# Patient Record
Sex: MALE | Race: WHITE | NOT HISPANIC OR LATINO | Employment: PART TIME | URBAN - METROPOLITAN AREA
[De-identification: names, ages, dates, MRNs, and addresses within clinical notes are randomized per-mention and may not be internally consistent; named-entity substitution may affect disease eponyms.]

---

## 2020-01-04 ENCOUNTER — OFFICE VISIT (OUTPATIENT)
Dept: URGENT CARE | Facility: MEDICAL CENTER | Age: 20
End: 2020-01-04
Payer: COMMERCIAL

## 2020-01-04 VITALS
HEIGHT: 71 IN | BODY MASS INDEX: 37.49 KG/M2 | DIASTOLIC BLOOD PRESSURE: 60 MMHG | TEMPERATURE: 98.5 F | HEART RATE: 91 BPM | WEIGHT: 267.8 LBS | OXYGEN SATURATION: 98 % | RESPIRATION RATE: 16 BRPM | SYSTOLIC BLOOD PRESSURE: 124 MMHG

## 2020-01-04 DIAGNOSIS — L08.9 INFECTED BLISTER OF LEFT FOOT, INITIAL ENCOUNTER: ICD-10-CM

## 2020-01-04 DIAGNOSIS — S90.822A INFECTED BLISTER OF LEFT FOOT, INITIAL ENCOUNTER: ICD-10-CM

## 2020-01-04 PROCEDURE — 99204 OFFICE O/P NEW MOD 45 MIN: CPT | Performed by: NURSE PRACTITIONER

## 2020-01-04 RX ORDER — CEPHALEXIN 500 MG/1
500 CAPSULE ORAL 4 TIMES DAILY
Qty: 28 CAP | Refills: 0 | Status: SHIPPED | OUTPATIENT
Start: 2020-01-04 | End: 2020-01-11

## 2020-01-04 ASSESSMENT — ENCOUNTER SYMPTOMS
FEVER: 0
BRUISES/BLEEDS EASILY: 0
TINGLING: 0
WEAKNESS: 0
CHILLS: 0
SENSORY CHANGE: 0
MYALGIAS: 0

## 2020-01-04 NOTE — LETTER
January 4, 2020       Patient: Kurt Gill   YOB: 2000   Date of Visit: 1/4/2020         To Whom It May Concern:    It is my medical opinion that Kurt Gill be excused from work on 1/6/20 due to illness.    If you have any questions or concerns, please don't hesitate to call [phone]          Sincerely,          NICOLETTE Elam.  Electronically Signed

## 2020-01-05 NOTE — PROGRESS NOTES
"Subjective:      Kurt Gill is a 19 y.o. male who presents with Foot Pain (left heel blister x1week, blister infection x4days)            HPI  States has blister at Achilles region x 4 days. Probable for loose fitting shoes. Wears Vans to work and walks all day. Admist to popping blister but became red, swollen and had purulent d/c from site, admits to poking abscess open with sterile needle. Father present. Denies fever or red streaking up leg. Pressure/pain at site of blister but with walking.     PMH:  has no past medical history on file.  MEDS:   Current Outpatient Medications:   •  cephALEXin (KEFLEX) 500 MG Cap, Take 1 Cap by mouth 4 times a day for 7 days., Disp: 28 Cap, Rfl: 0  ALLERGIES: No Known Allergies  SURGHX: History reviewed. No pertinent surgical history.  SOCHX:  reports that he has never smoked. He has never used smokeless tobacco.  FH: Family history was reviewed, no pertinent findings to report    Review of Systems   Constitutional: Negative for chills, fever and malaise/fatigue.   Cardiovascular: Negative for leg swelling.   Musculoskeletal: Negative for joint pain and myalgias.   Skin: Negative for itching and rash.   Neurological: Negative for tingling, sensory change and weakness.   Endo/Heme/Allergies: Does not bruise/bleed easily.   All other systems reviewed and are negative.         Objective:     /60 (BP Location: Left arm, Patient Position: Sitting, BP Cuff Size: Adult)   Pulse 91   Temp 36.9 °C (98.5 °F) (Temporal)   Resp 16   Ht 1.803 m (5' 11\")   Wt 121.5 kg (267 lb 12.8 oz)   SpO2 98%   BMI 37.35 kg/m²      Physical Exam  Vitals signs reviewed.   Constitutional:       General: He is awake. He is not in acute distress.     Appearance: Normal appearance. He is well-developed. He is not ill-appearing, toxic-appearing or diaphoretic.   HENT:      Head: Normocephalic.   Cardiovascular:      Rate and Rhythm: Normal rate.   Pulmonary:      Effort: Pulmonary effort " is normal. No accessory muscle usage or respiratory distress.   Musculoskeletal:         General: Swelling and tenderness present. No deformity.      Left foot: Normal range of motion and normal capillary refill. Tenderness and swelling present. No bony tenderness, crepitus, deformity or laceration.   Feet:      Left foot:      Protective Sensation: 10 sites tested. 10 sites sensed.      Skin integrity: Blister, erythema, warmth, callus and dry skin present. No ulcer, skin breakdown or fissure.   Skin:     General: Skin is warm and dry.      Findings: Abscess, erythema and signs of injury present. No abrasion, bruising, ecchymosis or laceration.             Comments: Blister like abscess to back of left foot at Achilles region, redness, swelling and able to express clear liquid fluid and purulent d/c from site. Procedure: Incision and Drainage  -Risks, benefits, and alternatives discussed. Risks include infection, bleeding, nerve damage, and poor cosmetic outcome  -Clean technique with sterile instruments  -Local anesthesia with 2% lidocaine   -Incision with #15 blade into fluctuant area with purulent material expressed  -Irrigated copiously with sterile saline  -Minimal bleeding with good hemostasis achieved  -Nonadhering bandage and coban  -The patient tolerated the procedure well       Neurological:      Mental Status: He is alert and oriented to person, place, and time.   Psychiatric:         Behavior: Behavior is cooperative.                 Assessment/Plan:       1. Infected blister of left foot, initial encounter    - cephALEXin (KEFLEX) 500 MG Cap; Take 1 Cap by mouth 4 times a day for 7 days.  Dispense: 28 Cap; Refill: 0    May apply cool compress to area for any swelling   Keep area clean with mild soap and tepid water, pat dry  May apply TAB ointment with no weeping, drainage from site  May cover loosely with bandage to prevent dirt or debris into wounds, as discussed  Monitor for skin infection with  increased swelling, redness, pain, d/c, fever, malaise, red streaking-recheck  Return prn  Recommend higher over the ankle well fitted shoes for excessive walking, padded gauze bandage with shoe wearing until wound healed

## 2021-01-14 ENCOUNTER — OFFICE VISIT (OUTPATIENT)
Dept: URGENT CARE | Facility: CLINIC | Age: 21
End: 2021-01-14
Payer: COMMERCIAL

## 2021-01-14 ENCOUNTER — HOSPITAL ENCOUNTER (OUTPATIENT)
Facility: MEDICAL CENTER | Age: 21
End: 2021-01-14
Attending: PHYSICIAN ASSISTANT
Payer: COMMERCIAL

## 2021-01-14 VITALS
DIASTOLIC BLOOD PRESSURE: 80 MMHG | WEIGHT: 250 LBS | HEART RATE: 112 BPM | RESPIRATION RATE: 16 BRPM | SYSTOLIC BLOOD PRESSURE: 136 MMHG | BODY MASS INDEX: 35 KG/M2 | HEIGHT: 71 IN | OXYGEN SATURATION: 98 % | TEMPERATURE: 97.7 F

## 2021-01-14 DIAGNOSIS — J01.40 ACUTE PANSINUSITIS, RECURRENCE NOT SPECIFIED: ICD-10-CM

## 2021-01-14 PROCEDURE — U0005 INFEC AGEN DETEC AMPLI PROBE: HCPCS

## 2021-01-14 PROCEDURE — 99214 OFFICE O/P EST MOD 30 MIN: CPT | Performed by: PHYSICIAN ASSISTANT

## 2021-01-14 PROCEDURE — U0003 INFECTIOUS AGENT DETECTION BY NUCLEIC ACID (DNA OR RNA); SEVERE ACUTE RESPIRATORY SYNDROME CORONAVIRUS 2 (SARS-COV-2) (CORONAVIRUS DISEASE [COVID-19]), AMPLIFIED PROBE TECHNIQUE, MAKING USE OF HIGH THROUGHPUT TECHNOLOGIES AS DESCRIBED BY CMS-2020-01-R: HCPCS

## 2021-01-14 RX ORDER — AMOXICILLIN AND CLAVULANATE POTASSIUM 875; 125 MG/1; MG/1
1 TABLET, FILM COATED ORAL 2 TIMES DAILY
Qty: 14 TAB | Refills: 0 | Status: SHIPPED | OUTPATIENT
Start: 2021-01-14 | End: 2021-01-21

## 2021-01-14 ASSESSMENT — ENCOUNTER SYMPTOMS
PALPITATIONS: 0
WHEEZING: 0
SWOLLEN GLANDS: 0
SORE THROAT: 1
VOMITING: 0
SINUS PRESSURE: 1
NAUSEA: 0
SPUTUM PRODUCTION: 0
MYALGIAS: 1
SINUS PAIN: 1
DIARRHEA: 0
CHILLS: 1
NECK PAIN: 0
HEADACHES: 1
ABDOMINAL PAIN: 0
SHORTNESS OF BREATH: 0
COUGH: 0
FEVER: 1

## 2021-01-15 LAB
COVID ORDER STATUS COVID19: NORMAL
SARS-COV-2 RNA RESP QL NAA+PROBE: NOTDETECTED
SPECIMEN SOURCE: NORMAL

## 2021-01-15 NOTE — PROGRESS NOTES
"Subjective:      Kurt Gill is a 20 y.o. male who presents with Nasal Congestion (sore throat, fever (99), x1 week and a half)            Sinus Problem  This is a new problem. Episode onset: 2 weeks. Sore throat x 4 days. The problem has been gradually worsening since onset. Maximum temperature: low-grade today- 99F. The pain is moderate. Associated symptoms include chills, congestion, ear pain (bilateral ear pressure ), headaches, sinus pressure and a sore throat. Pertinent negatives include no coughing, neck pain, shortness of breath, sneezing or swollen glands. (Fatigue ) Treatments tried: Dayquil. The treatment provided no relief.       History reviewed. No pertinent past medical history.    History reviewed. No pertinent surgical history.    History reviewed. No pertinent family history.    No Known Allergies    Medications, Allergies, and current problem list reviewed today in Epic    Review of Systems   Constitutional: Positive for chills, fever and malaise/fatigue.   HENT: Positive for congestion, ear pain (bilateral ear pressure ), sinus pressure, sinus pain and sore throat. Negative for ear discharge and sneezing.    Respiratory: Negative for cough, sputum production, shortness of breath and wheezing.    Cardiovascular: Negative for chest pain, palpitations and leg swelling.   Gastrointestinal: Negative for abdominal pain, diarrhea, nausea and vomiting.   Musculoskeletal: Positive for myalgias. Negative for neck pain.   Neurological: Positive for headaches.     All other systems reviewed and are negative.        Objective:     /80 (BP Location: Right arm, Patient Position: Sitting, BP Cuff Size: Adult long)   Pulse (!) 112   Temp 36.5 °C (97.7 °F) (Temporal)   Resp 16   Ht 1.803 m (5' 11\")   Wt 113.4 kg (250 lb)   SpO2 98%   BMI 34.87 kg/m²      Physical Exam  Constitutional:       General: He is not in acute distress.     Appearance: He is not ill-appearing.   HENT:      Head: " Normocephalic and atraumatic.      Right Ear: Tympanic membrane, ear canal and external ear normal.      Left Ear: Tympanic membrane, ear canal and external ear normal.      Nose: Mucosal edema, congestion and rhinorrhea present.      Right Sinus: Maxillary sinus tenderness and frontal sinus tenderness present.      Left Sinus: Maxillary sinus tenderness and frontal sinus tenderness present.      Mouth/Throat:      Mouth: Mucous membranes are moist.      Pharynx: Posterior oropharyngeal erythema present. No oropharyngeal exudate.   Eyes:      Conjunctiva/sclera: Conjunctivae normal.   Cardiovascular:      Rate and Rhythm: Normal rate and regular rhythm.      Heart sounds: Normal heart sounds. No murmur. No friction rub. No gallop.    Pulmonary:      Effort: Pulmonary effort is normal. No respiratory distress.      Breath sounds: Normal breath sounds. No wheezing, rhonchi or rales.   Musculoskeletal: Normal range of motion.   Lymphadenopathy:      Cervical: No cervical adenopathy.   Skin:     General: Skin is warm and dry.      Findings: No rash.   Neurological:      General: No focal deficit present.      Mental Status: He is alert and oriented to person, place, and time.   Psychiatric:         Mood and Affect: Mood normal.         Behavior: Behavior normal.         Thought Content: Thought content normal.         Judgment: Judgment normal.                 Assessment/Plan:        1. Acute pansinusitis, recurrence not specified    This has been or will be performed: Review of prior external notes from unique source, review of results from unique test, and ordered a unique test.     - amoxicillin-clavulanate (AUGMENTIN) 875-125 MG Tab; Take 1 Tab by mouth 2 times a day for 7 days.  Dispense: 14 Tab; Refill: 0  - SARS-CoV-2 PCR (24 hour In-House): Collect NP swab in VTM; Future    Flonase and saline rinses advised.  COVID test pending.  Isolation guidelines, conservative measures and ER precautions discussed.     COVID  handout given.    Differential diagnoses, Supportive care, and indications for immediate follow-up discussed with patient.   Pathogenesis of diagnosis discussed including typical length and natural progression.   Instructed to return to clinic or nearest emergency department for any change in condition, further concerns, or worsening of symptoms.    The patient demonstrated a good understanding and agreed with the treatment plan.    Ramona Cartagena P.A.-C.

## 2021-05-29 ENCOUNTER — OFFICE VISIT (OUTPATIENT)
Dept: URGENT CARE | Facility: PHYSICIAN GROUP | Age: 21
End: 2021-05-29
Payer: COMMERCIAL

## 2021-05-29 VITALS
WEIGHT: 240 LBS | SYSTOLIC BLOOD PRESSURE: 118 MMHG | OXYGEN SATURATION: 97 % | DIASTOLIC BLOOD PRESSURE: 78 MMHG | HEART RATE: 98 BPM | HEIGHT: 71 IN | RESPIRATION RATE: 16 BRPM | BODY MASS INDEX: 33.6 KG/M2 | TEMPERATURE: 97.8 F

## 2021-05-29 DIAGNOSIS — M62.838 MUSCLE SPASM: ICD-10-CM

## 2021-05-29 PROCEDURE — 99213 OFFICE O/P EST LOW 20 MIN: CPT | Performed by: PHYSICIAN ASSISTANT

## 2021-05-29 RX ORDER — CYCLOBENZAPRINE HCL 10 MG
10 TABLET ORAL 3 TIMES DAILY PRN
Qty: 30 TABLET | Refills: 0 | Status: SHIPPED | OUTPATIENT
Start: 2021-05-29 | End: 2021-06-08

## 2021-05-29 ASSESSMENT — ENCOUNTER SYMPTOMS
BACK PAIN: 1
SENSORY CHANGE: 0
SHORTNESS OF BREATH: 0
FOCAL WEAKNESS: 0
WEAKNESS: 0
ROS GI COMMENTS: NO FECAL INCONTINENCE

## 2021-05-29 ASSESSMENT — PAIN SCALES - GENERAL: PAINLEVEL: 6=MODERATE PAIN

## 2021-05-29 NOTE — PROGRESS NOTES
"Subjective:   Kurt Gill is a 20 y.o. male who presents for Back Pain (x1 week, back pain, x1 day. muscles spasm in lower back this morning )      Back Pain  Pertinent negatives include no chest pain or weakness.       Review of Systems   Respiratory: Negative for shortness of breath.    Cardiovascular: Negative for chest pain.   Gastrointestinal:        No fecal incontinence   Genitourinary:        No urinary retention/incontinance.   Musculoskeletal: Positive for back pain.   Neurological: Negative for sensory change, focal weakness and weakness.        Able to walk       Medications:    • cyclobenzaprine Tabs    Allergies: Patient has no known allergies.    Problem List: Kurt Gill does not have a problem list on file.    Surgical History:  No past surgical history on file.    Past Social Hx: Kurt Gill  reports that he has never smoked. He has never used smokeless tobacco. He reports that he does not drink alcohol.     Past Family Hx:  Kurt Gill family history is not on file.     Problem list, medications, and allergies reviewed by myself today in Epic.     Objective:     Blood Pressure 118/78   Pulse 98   Temperature 36.6 °C (97.8 °F) (Temporal)   Respiration 16   Height 1.803 m (5' 11\")   Weight 109 kg (240 lb)   Oxygen Saturation 97%   Body Mass Index 33.47 kg/m²     Physical Exam  Vitals reviewed.   Constitutional:       General: He is not in acute distress.     Appearance: He is well-developed. He is not diaphoretic.   Cardiovascular:      Rate and Rhythm: Normal rate and regular rhythm.      Pulses: Normal pulses.           Dorsalis pedis pulses are 2+ on the right side and 2+ on the left side.        Posterior tibial pulses are 2+ on the right side and 2+ on the left side.      Heart sounds: Normal heart sounds.   Pulmonary:      Effort: Pulmonary effort is normal. No respiratory distress.      Breath sounds: Normal breath sounds. No wheezing or rales. "   Chest:      Chest wall: No tenderness.   Abdominal:      General: Bowel sounds are normal. There is no distension.      Palpations: Abdomen is soft. There is no mass.      Tenderness: There is no abdominal tenderness. There is no guarding or rebound.      Hernia: No hernia is present.   Musculoskeletal:         General: Tenderness present. Normal range of motion.      Cervical back: Normal range of motion and neck supple.      Comments: PTP of the right lumbar region.  No focal midline tenderness of the spine.  Flexion, extension, rotation and lateral bend of back limited by pain.     Skin:     General: Skin is warm and dry.      Capillary Refill: Capillary refill takes less than 2 seconds.      Findings: No rash.   Neurological:      Mental Status: He is alert and oriented to person, place, and time.      Sensory: No sensory deficit.      Motor: No abnormal muscle tone.      Coordination: Coordination normal.      Deep Tendon Reflexes: Reflexes normal.      Comments: Bilateral lower extremity strength and sensory intact.  Negative straight leg raise.  Knee and ankle reflexes intact.     Psychiatric:         Behavior: Behavior normal.         Thought Content: Thought content normal.         Judgment: Judgment normal.         Assessment/Plan:     Medical Decision Making/Comments     Pt is a 20 yr old male who presents for evaluation of acute back pain.  Pt states he has been experiencing back pain for 1 week and worsened to severe spasms this morning.  Pt denies symptoms of fever, trauma, neurologic deficit, new urinary retention, fecal incontinence, or saddle anesthesia.  Pt does not have a PMH of cancer, steroid use, IVDU, immunocompromise or recent procedure.  Vital signs normal.  Back exam shows no focal midline spinal tenderness.  Pt able to sit up and lay down, somewhat limited by pain.  Neurologic exam shows 5/5 bilateral strength of legs (hip, knee, and foot flexion/extension), knee and ankle reflexes  intact, sensation normal bilaterally, pt can walk with an antalgic gait.  Abdominal exam unremarkable.  Distal extremity pulses intact.  Based on history, symptoms, and exam pt is not likely experiencing pain from a fracture/infection of the spine, spinal cord compression, vessel or visceral etiology.  This is a nonspecific LBP likely of benign muscular etiology.      Differential Diagnosis includes but not limited to:     Muscular strain/spasm, herniated disc, spinal fracture       Diagnosis and associated orders     1. Muscle spasm  cyclobenzaprine (FLEXERIL) 10 mg Tab    REFERRAL TO OUTPATIENT INTERVENTIONAL PAIN CLINIC              Differential diagnosis, natural history, supportive care, and indications for immediate follow-up discussed.    Advised the patient to follow-up with the primary care physician for recheck, reevaluation, and consideration of further management.    Please note that this dictation was created using voice recognition software. I have made a reasonable attempt to correct obvious errors, but I expect that there are errors of grammar and possibly content that I did not discover before finalizing the note.

## 2023-02-12 ENCOUNTER — OFFICE VISIT (OUTPATIENT)
Dept: URGENT CARE | Facility: PHYSICIAN GROUP | Age: 23
End: 2023-02-12
Payer: COMMERCIAL

## 2023-02-12 VITALS
HEIGHT: 70 IN | HEART RATE: 100 BPM | SYSTOLIC BLOOD PRESSURE: 124 MMHG | RESPIRATION RATE: 18 BRPM | TEMPERATURE: 99 F | DIASTOLIC BLOOD PRESSURE: 64 MMHG | WEIGHT: 240 LBS | OXYGEN SATURATION: 100 % | BODY MASS INDEX: 34.36 KG/M2

## 2023-02-12 DIAGNOSIS — L03.818 CELLULITIS OF OTHER SPECIFIED SITE: ICD-10-CM

## 2023-02-12 PROCEDURE — 99213 OFFICE O/P EST LOW 20 MIN: CPT | Performed by: FAMILY MEDICINE

## 2023-02-12 RX ORDER — CEPHALEXIN 250 MG/1
250 CAPSULE ORAL 4 TIMES DAILY
Qty: 20 CAPSULE | Refills: 0 | Status: SHIPPED | OUTPATIENT
Start: 2023-02-12 | End: 2023-02-17

## 2023-02-12 NOTE — PROGRESS NOTES
"  Subjective:      22 y.o. male presents to urgent care for discharge out of his [facility] that he first noticed about a week ago.  Initially it was bloody, but now is more of a yellowish discoloration.  No associated pain.  He has tried topical triple antibiotic ointment without any relief in symptoms.  He remains afebrile.    He denies any other questions or concerns at this time.    Current problem list, medication, and past medical/surgical history were reviewed in Epic.    ROS  See HPI     Objective:      /64 (BP Location: Left arm, Patient Position: Sitting, BP Cuff Size: Adult)   Pulse 100   Temp 37.2 °C (99 °F) (Temporal)   Resp 18   Ht 1.778 m (5' 10\")   Wt 109 kg (240 lb)   SpO2 100%   BMI 34.44 kg/m²     Physical Exam  Constitutional:       General: He is not in acute distress.     Appearance: He is not diaphoretic.   Cardiovascular:      Rate and Rhythm: Normal rate and regular rhythm.      Heart sounds: Normal heart sounds.   Pulmonary:      Effort: Pulmonary effort is normal. No respiratory distress.      Breath sounds: Normal breath sounds.   Skin:     Comments: Erythema to his inner umbilicus.  There is thick, yellow discharge.   Neurological:      Mental Status: He is alert.   Psychiatric:         Mood and Affect: Affect normal.         Judgment: Judgment normal.     Assessment/Plan:     1. Cellulitis of other specified site  Prescription for Keflex has been sent.  - cephALEXin (KEFLEX) 250 MG Cap; Take 1 Capsule by mouth 4 times a day for 5 days.  Dispense: 20 Capsule; Refill: 0      Instructed to return to Urgent Care or nearest Emergency Department if symptoms fail to improve, for any change in condition, further concerns, or new concerning symptoms. Patient states understanding of the plan of care and discharge instructions.    Gala Garcia M.D.   "

## 2023-02-12 NOTE — LETTER
February 12, 2023    To Whom It May Concern:         This is confirmation that Kurt Gill attended his scheduled appointment with Gala Garcia M.D. on 2/12/23.  He may return to work on Tuesday without any restrictions       If you have any questions please do not hesitate to call me at the phone number listed below.    Sincerely,          Gala Garcia M.D.  [phone]

## 2023-03-16 ENCOUNTER — HOSPITAL ENCOUNTER (OUTPATIENT)
Dept: LAB | Facility: MEDICAL CENTER | Age: 23
End: 2023-03-16
Attending: STUDENT IN AN ORGANIZED HEALTH CARE EDUCATION/TRAINING PROGRAM
Payer: COMMERCIAL

## 2023-03-16 LAB
ALBUMIN SERPL BCP-MCNC: 4.8 G/DL (ref 3.2–4.9)
ALBUMIN/GLOB SERPL: 1.6 G/DL
ALP SERPL-CCNC: 68 U/L (ref 30–99)
ALT SERPL-CCNC: 31 U/L (ref 2–50)
ANION GAP SERPL CALC-SCNC: 20 MMOL/L (ref 7–16)
APPEARANCE UR: ABNORMAL
AST SERPL-CCNC: 61 U/L (ref 12–45)
BACTERIA #/AREA URNS HPF: NEGATIVE /HPF
BASOPHILS # BLD AUTO: 0.6 % (ref 0–1.8)
BASOPHILS # BLD: 0.05 K/UL (ref 0–0.12)
BILIRUB SERPL-MCNC: 0.5 MG/DL (ref 0.1–1.5)
BILIRUB UR QL STRIP.AUTO: NEGATIVE
BUN SERPL-MCNC: 15 MG/DL (ref 8–22)
CALCIUM ALBUM COR SERPL-MCNC: 9.4 MG/DL (ref 8.5–10.5)
CALCIUM SERPL-MCNC: 10 MG/DL (ref 8.5–10.5)
CHLORIDE SERPL-SCNC: 105 MMOL/L (ref 96–112)
CO2 SERPL-SCNC: 16 MMOL/L (ref 20–33)
COLOR UR: YELLOW
CREAT SERPL-MCNC: 0.97 MG/DL (ref 0.5–1.4)
EOSINOPHIL # BLD AUTO: 0.1 K/UL (ref 0–0.51)
EOSINOPHIL NFR BLD: 1.2 % (ref 0–6.9)
EPI CELLS #/AREA URNS HPF: NEGATIVE /HPF
ERYTHROCYTE [DISTWIDTH] IN BLOOD BY AUTOMATED COUNT: 43.2 FL (ref 35.9–50)
EST. AVERAGE GLUCOSE BLD GHB EST-MCNC: 105 MG/DL
GFR SERPLBLD CREATININE-BSD FMLA CKD-EPI: 113 ML/MIN/1.73 M 2
GLOBULIN SER CALC-MCNC: 3 G/DL (ref 1.9–3.5)
GLUCOSE SERPL-MCNC: 121 MG/DL (ref 65–99)
GLUCOSE UR STRIP.AUTO-MCNC: NEGATIVE MG/DL
HBA1C MFR BLD: 5.3 % (ref 4–5.6)
HCT VFR BLD AUTO: 50.4 % (ref 42–52)
HGB BLD-MCNC: 17.6 G/DL (ref 14–18)
HYALINE CASTS #/AREA URNS LPF: ABNORMAL /LPF
IMM GRANULOCYTES # BLD AUTO: 0.02 K/UL (ref 0–0.11)
IMM GRANULOCYTES NFR BLD AUTO: 0.2 % (ref 0–0.9)
KETONES UR STRIP.AUTO-MCNC: ABNORMAL MG/DL
LEUKOCYTE ESTERASE UR QL STRIP.AUTO: NEGATIVE
LYMPHOCYTES # BLD AUTO: 3.49 K/UL (ref 1–4.8)
LYMPHOCYTES NFR BLD: 41.7 % (ref 22–41)
MCH RBC QN AUTO: 33.3 PG (ref 27–33)
MCHC RBC AUTO-ENTMCNC: 34.9 G/DL (ref 33.7–35.3)
MCV RBC AUTO: 95.5 FL (ref 81.4–97.8)
MICRO URNS: ABNORMAL
MONOCYTES # BLD AUTO: 0.62 K/UL (ref 0–0.85)
MONOCYTES NFR BLD AUTO: 7.4 % (ref 0–13.4)
NEUTROPHILS # BLD AUTO: 4.08 K/UL (ref 1.82–7.42)
NEUTROPHILS NFR BLD: 48.9 % (ref 44–72)
NITRITE UR QL STRIP.AUTO: NEGATIVE
NRBC # BLD AUTO: 0 K/UL
NRBC BLD-RTO: 0 /100 WBC
PH UR STRIP.AUTO: 5.5 [PH] (ref 5–8)
PLATELET # BLD AUTO: 337 K/UL (ref 164–446)
PMV BLD AUTO: 10.8 FL (ref 9–12.9)
POTASSIUM SERPL-SCNC: 4.7 MMOL/L (ref 3.6–5.5)
PROT SERPL-MCNC: 7.8 G/DL (ref 6–8.2)
PROT UR QL STRIP: NEGATIVE MG/DL
RBC # BLD AUTO: 5.28 M/UL (ref 4.7–6.1)
RBC # URNS HPF: ABNORMAL /HPF
RBC UR QL AUTO: NEGATIVE
SODIUM SERPL-SCNC: 141 MMOL/L (ref 135–145)
SP GR UR STRIP.AUTO: 1.02
T4 FREE SERPL-MCNC: 1.49 NG/DL (ref 0.93–1.7)
TSH SERPL DL<=0.005 MIU/L-ACNC: 2.28 UIU/ML (ref 0.38–5.33)
UROBILINOGEN UR STRIP.AUTO-MCNC: 1 MG/DL
WBC # BLD AUTO: 8.4 K/UL (ref 4.8–10.8)
WBC #/AREA URNS HPF: ABNORMAL /HPF

## 2023-03-16 PROCEDURE — 84439 ASSAY OF FREE THYROXINE: CPT

## 2023-03-16 PROCEDURE — 83036 HEMOGLOBIN GLYCOSYLATED A1C: CPT

## 2023-03-16 PROCEDURE — 36415 COLL VENOUS BLD VENIPUNCTURE: CPT

## 2023-03-16 PROCEDURE — 80053 COMPREHEN METABOLIC PANEL: CPT

## 2023-03-16 PROCEDURE — 85025 COMPLETE CBC W/AUTO DIFF WBC: CPT

## 2023-03-16 PROCEDURE — 81001 URINALYSIS AUTO W/SCOPE: CPT

## 2023-03-16 PROCEDURE — 84443 ASSAY THYROID STIM HORMONE: CPT

## 2023-03-18 ENCOUNTER — HOSPITAL ENCOUNTER (OUTPATIENT)
Facility: MEDICAL CENTER | Age: 23
End: 2023-03-18
Attending: NURSE PRACTITIONER
Payer: COMMERCIAL

## 2023-03-18 ENCOUNTER — OFFICE VISIT (OUTPATIENT)
Dept: URGENT CARE | Facility: PHYSICIAN GROUP | Age: 23
End: 2023-03-18
Payer: COMMERCIAL

## 2023-03-18 VITALS
TEMPERATURE: 97.8 F | WEIGHT: 293 LBS | DIASTOLIC BLOOD PRESSURE: 82 MMHG | SYSTOLIC BLOOD PRESSURE: 120 MMHG | HEIGHT: 69 IN | RESPIRATION RATE: 14 BRPM | HEART RATE: 100 BPM | BODY MASS INDEX: 43.4 KG/M2 | OXYGEN SATURATION: 99 %

## 2023-03-18 DIAGNOSIS — R35.0 URINARY FREQUENCY: ICD-10-CM

## 2023-03-18 DIAGNOSIS — M54.50 TENDERNESS OF LEFT LUMBAR REGION: ICD-10-CM

## 2023-03-18 LAB
APPEARANCE UR: CLEAR
BILIRUB UR STRIP-MCNC: NORMAL MG/DL
COLOR UR AUTO: YELLOW
GLUCOSE UR STRIP.AUTO-MCNC: NORMAL MG/DL
KETONES UR STRIP.AUTO-MCNC: NORMAL MG/DL
LEUKOCYTE ESTERASE UR QL STRIP.AUTO: NORMAL
NITRITE UR QL STRIP.AUTO: NORMAL
PH UR STRIP.AUTO: 6.5 [PH] (ref 5–8)
PROT UR QL STRIP: NORMAL MG/DL
RBC UR QL AUTO: NORMAL
SP GR UR STRIP.AUTO: 1.02
UROBILINOGEN UR STRIP-MCNC: 0.2 MG/DL

## 2023-03-18 PROCEDURE — 81002 URINALYSIS NONAUTO W/O SCOPE: CPT | Performed by: NURSE PRACTITIONER

## 2023-03-18 PROCEDURE — 99213 OFFICE O/P EST LOW 20 MIN: CPT | Performed by: NURSE PRACTITIONER

## 2023-03-18 PROCEDURE — 87086 URINE CULTURE/COLONY COUNT: CPT

## 2023-03-18 ASSESSMENT — FIBROSIS 4 INDEX: FIB4 SCORE: 0.72

## 2023-03-18 NOTE — LETTER
[facility]  [facility] [address] [id number]     March 18, 2023    Patient: Kurt Gill   YOB: 2000   Date of Visit: 3/18/2023       To Whom It May Concern:    Kurt Gill was seen and treated in our department on 3/18/2023.     Sincerely,     Maddie Chou A.P.R.N

## 2023-03-18 NOTE — PROGRESS NOTES
"Subjective:   Kurt Gill is a 22 y.o. male who presents for UTI (Frequent urgency, pain lower lt side of back, chills, x1 wk)       HPI  Patient presents for evaluation of 1 week history of urinary frequency, states he will need to urinate every 15 minutes while he is at home, and every 45 minutes while at work, and left-sided low back pain.  Patient recently saw his primary care provider, had labs drawn due to myriad of symptoms.  He has a follow-up this next week.  Patient denies any trauma or injury of which she could have injured his left lower back.  Denies history of low back pain.    ROS  All other systems are negative except as documented above within HPI.    MEDS: No current outpatient medications on file.  ALLERGIES: No Known Allergies    Patient's PMH, SocHx, SurgHx, FamHx, Drug allergies and medications were reviewed.     Objective:   /82   Pulse 100   Temp 36.6 °C (97.8 °F) (Temporal)   Resp 14   Ht 1.753 m (5' 9\")   Wt (!) 133 kg (293 lb)   SpO2 99%   BMI 43.27 kg/m²     Physical Exam  Vitals and nursing note reviewed.   Constitutional:       General: He is awake.      Appearance: Normal appearance. He is well-developed.   HENT:      Head: Normocephalic and atraumatic.      Right Ear: External ear normal.      Left Ear: External ear normal.      Nose: Nose normal.      Mouth/Throat:      Lips: Pink.      Mouth: Mucous membranes are moist.      Pharynx: Oropharynx is clear.   Eyes:      General: Lids are normal.      Extraocular Movements: Extraocular movements intact.      Conjunctiva/sclera: Conjunctivae normal.   Cardiovascular:      Rate and Rhythm: Normal rate and regular rhythm.   Pulmonary:      Effort: Pulmonary effort is normal.   Abdominal:      Tenderness: There is no right CVA tenderness or left CVA tenderness.   Musculoskeletal:         General: Normal range of motion.      Cervical back: Normal range of motion.        Back:       Comments: Slight TTP in area " diagrammed   Skin:     General: Skin is warm and dry.   Neurological:      Mental Status: He is alert and oriented to person, place, and time.   Psychiatric:         Mood and Affect: Mood normal.         Behavior: Behavior normal. Behavior is cooperative.         Thought Content: Thought content normal.         Judgment: Judgment normal.       Assessment/Plan:   Assessment    1. Urinary frequency  - POCT Urinalysis  - URINE CULTURE(NEW); Future    2. Tenderness of left lumbar region      Vital signs stable at today's acute urgent care visit.  Reviewed urinalysis completed in clinic, negative for acute process.  We will send urine for culture.  Patient also has follow-up appointment with his PCP this upcoming week.    Advised the patient to follow-up with the primary care provider/urgent care if symptoms persist.  Red flags discussed and indications to immediately call 911 or present to the ED. All questions were encouraged and answered to the patient's satisfaction and understanding, and they agree to the plan of care.     This is an acute problem with uncertain prognosis, medication management and instructions as well as management options were provided.  I personally reviewed prior external notes and test results pertinent to today and independently reviewed and interpreted all diagnostics, to include POC testing. Time spent evaluating this patient includes preparing for visit, counseling/education, exam, evaluation, obtaining history, and ordering lab/test/procedures.      Please note that this dictation was created using voice recognition software. I have made a reasonable attempt to correct obvious errors, but I expect that there are errors of grammar and possibly content that I did not discover before finalizing the note.

## 2023-03-20 DIAGNOSIS — R35.0 URINARY FREQUENCY: ICD-10-CM

## 2023-03-23 LAB
BACTERIA UR CULT: NORMAL
SIGNIFICANT IND 70042: NORMAL
SITE SITE: NORMAL
SOURCE SOURCE: NORMAL